# Patient Record
Sex: FEMALE | Race: WHITE
[De-identification: names, ages, dates, MRNs, and addresses within clinical notes are randomized per-mention and may not be internally consistent; named-entity substitution may affect disease eponyms.]

---

## 2017-03-28 ENCOUNTER — HOSPITAL ENCOUNTER (OUTPATIENT)
Dept: HOSPITAL 56 - MW.CHFP | Age: 57
End: 2017-03-28
Attending: FAMILY MEDICINE
Payer: COMMERCIAL

## 2017-03-28 DIAGNOSIS — L71.9: ICD-10-CM

## 2017-03-28 DIAGNOSIS — E66.3: Primary | ICD-10-CM

## 2017-03-28 LAB
CHLORIDE SERPL-SCNC: 103 MMOL/L (ref 98–110)
SODIUM SERPL-SCNC: 140 MMOL/L (ref 136–146)

## 2018-07-05 ENCOUNTER — HOSPITAL ENCOUNTER (EMERGENCY)
Dept: HOSPITAL 56 - MW.ED | Age: 58
Discharge: HOME | End: 2018-07-05
Payer: COMMERCIAL

## 2018-07-05 DIAGNOSIS — Z88.1: ICD-10-CM

## 2018-07-05 DIAGNOSIS — Z91.013: ICD-10-CM

## 2018-07-05 DIAGNOSIS — Z91.048: ICD-10-CM

## 2018-07-05 DIAGNOSIS — R07.89: Primary | ICD-10-CM

## 2018-07-05 DIAGNOSIS — Z88.2: ICD-10-CM

## 2018-07-05 LAB
CHLORIDE SERPL-SCNC: 104 MMOL/L (ref 98–107)
SODIUM SERPL-SCNC: 139 MMOL/L (ref 136–145)

## 2018-07-05 PROCEDURE — 81001 URINALYSIS AUTO W/SCOPE: CPT

## 2018-07-05 PROCEDURE — 93005 ELECTROCARDIOGRAM TRACING: CPT

## 2018-07-05 PROCEDURE — 83690 ASSAY OF LIPASE: CPT

## 2018-07-05 PROCEDURE — 84484 ASSAY OF TROPONIN QUANT: CPT

## 2018-07-05 PROCEDURE — 96361 HYDRATE IV INFUSION ADD-ON: CPT

## 2018-07-05 PROCEDURE — 71045 X-RAY EXAM CHEST 1 VIEW: CPT

## 2018-07-05 PROCEDURE — 85025 COMPLETE CBC W/AUTO DIFF WBC: CPT

## 2018-07-05 PROCEDURE — 99285 EMERGENCY DEPT VISIT HI MDM: CPT

## 2018-07-05 PROCEDURE — 80053 COMPREHEN METABOLIC PANEL: CPT

## 2018-07-05 PROCEDURE — 36415 COLL VENOUS BLD VENIPUNCTURE: CPT

## 2018-07-05 PROCEDURE — 96374 THER/PROPH/DIAG INJ IV PUSH: CPT

## 2018-07-05 NOTE — EDM.PDOC
ED HPI GENERAL MEDICAL PROBLEM





- General


Chief Complaint: Chest Pain


Stated Complaint: PAIN UNDER RIGHT BREAST


Time Seen by Provider: 07/05/18 15:05


Source of Information: Reports: Patient





- History of Present Illness


INITIAL COMMENTS - FREE TEXT/NARRATIVE: 





HISTORY AND PHYSICAL:


History of present illness:


[Patient presents with chest wall pain below her right breast on the lower rib 

margins intermittent sharp pain which began after physical therapy a couple of 

days ago some new exercising





She also carries her purse on the right side and has some new cats that she is 

been chasing around at work in addition to scoliosis I believe all of these 

contribute to the reproducible chest wall pain/muscle spasm reinserting





Current she is asymptomatic at rest however with palpation I can reproduce the 

symptoms she rates pain 0 out of 10 at rest 3-4 out of 10 with palpation





History of cholecystectomy





]


Review of systems: 


As per history of present illness and below otherwise all systems reviewed and 

negative.


Past medical history: 


As per history of present illness and as reviewed below otherwise 

noncontributory.


Surgical history: 


As per history of present illness and as reviewed below otherwise 

noncontributory.


Social history: 


No reported history of drug or alcohol abuse.


Family history: 


As per history of present illness and as reviewed below otherwise 

noncontributory.


Physical exam:


HEENT: Atraumatic, normocephalic, pupils reactive, negative for conjunctival 

pallor or scleral icterus, mucous membranes moist, throat clear, neck supple, 

nontender, trachea midline.


Lungs: Clear to auscultation, breath sounds equal bilaterally, chest nontender 

to the left I can reproduce chest wall tenderness on the lower right rib 

margins on the midclavicular line just below the breast


Heart: S1S2, regular, negative for clicks, rubs, or JVD.


Abdomen: Soft, nondistended, nontender. Negative for masses or 

hepatosplenomegaly. Negative for costovertebral tenderness.


Pelvis: Stable nontender.


Genitourinary: Deferred.


Rectal: Deferred.


Extremities: Atraumatic, negative for cords or calf pain. Neurovascular 

unremarkable.


Neuro: Awake, alert, oriented. Cranial nerves II through XII unremarkable. 

Cerebellum unremarkable. Motor and sensory unremarkable throughout. Exam 

nonfocal.


Diagnostics:


[CBC CMP UA troponin


EKG


Chest 1 view


]


Therapeutics:


[ normal saline


Toradol


]





Rest ice ibuprofen





Impression: 


[ reproducible chest wall pain 


Chronic history of baseline


]


Definitive disposition and diagnosis as appropriate pending reevaluation and 

review of above.


  ** Right Breast


Pain Score (Numeric/FACES): 0





- Related Data


 Allergies











Allergy/AdvReac Type Severity Reaction Status Date / Time


 


iodine Allergy  Other Verified 07/05/18 14:57


 


shellfish derived Allergy  Other Verified 07/05/18 14:57


 


sulfamethoxazole Allergy  Other Verified 07/05/18 14:57





[From Bactrim]     


 


trimethoprim [From Bactrim] Allergy  Other Verified 07/05/18 14:57











Home Meds: 


 Home Meds





Albuterol Sulfate [Albuterol Sulfate HFA] 1 - 2 puff INH ASDIRECTED PRN 05/02/

15 [History]


Levothyroxine [Synthroid] 50 mcg PO DAILY 07/05/18 [History]


Omeprazole 20 mg PO DAILY 07/05/18 [History]


Spironolactone 50 mg PO DAILY 07/05/18 [History]











Past Medical History


Respiratory History: Reports: Asthma


OB/GYN History: Reports: Endometriosis, Pregnancy


Other OB/GYN History: breast implant


Musculoskeletal History: Reports: Other (See Below)


Other Musculoskeletal History: spine curvature


Psychiatric History: Reports: None


Dermatologic History: Reports: Melanoma





- Infectious Disease History


Infectious Disease History: Reports: None





- Past Surgical History


GI Surgical History: Reports: Cholecystectomy


Female  Surgical History: Reports: D&C





Social & Family History





- Family History


Family Medical History: Noncontributory





- Tobacco Use


Smoking Status *Q: Never Smoker





- Caffeine Use


Caffeine Use: Reports: Coffee





- Recreational Drug Use


Recreational Drug Use: No





ED ROS GENERAL





- Review of Systems


Review Of Systems: See Below





ED EXAM, GENERAL





- Physical Exam


Exam: See Below





Course





- Vital Signs


Last Recorded V/S: 


 Last Vital Signs











Temp  98.0 F   07/05/18 14:57


 


Pulse  67   07/05/18 14:57


 


Resp  16   07/05/18 14:57


 


BP  161/77 H  07/05/18 14:57


 


Pulse Ox  100   07/05/18 14:57














- Orders/Labs/Meds


Orders: 


 Active Orders 24 hr











 Category Date Time Status


 


 EKG Documentation Completion [RC] STAT Care  07/05/18 15:27 Active


 


 Chest 1V Frontal [CR] Stat Exams  07/05/18 15:04 Taken


 


 UA W/MICROSCOPIC [URIN] Stat Lab  07/05/18 16:09 Ordered











Labs: 


 Laboratory Tests











  07/05/18 07/05/18 07/05/18 Range/Units





  15:10 15:10 16:09 


 


WBC  7.03    (4.0-11.0)  K/uL


 


RBC  4.75    (4.30-5.90)  M/uL


 


Hgb  14.5    (12.0-16.0)  g/dL


 


Hct  41.8    (36.0-46.0)  %


 


MCV  88.0    (80.0-98.0)  fL


 


MCH  30.5    (27.0-32.0)  pg


 


MCHC  34.7    (31.0-37.0)  g/dL


 


RDW Std Deviation  41.8    (28.0-62.0)  fl


 


RDW Coeff of Orlin  13    (11.0-15.0)  %


 


Plt Count  281    (150-400)  K/uL


 


MPV  10.10    (7.40-12.00)  fL


 


Neut % (Auto)  57.5    (48.0-80.0)  %


 


Lymph % (Auto)  31.6    (16.0-40.0)  %


 


Mono % (Auto)  8.7    (0.0-15.0)  %


 


Eos % (Auto)  1.6    (0.0-7.0)  %


 


Baso % (Auto)  0.6    (0.0-1.5)  %


 


Neut # (Auto)  4.1    (1.4-5.7)  K/uL


 


Lymph # (Auto)  2.2    (0.6-2.4)  K/uL


 


Mono # (Auto)  0.6    (0.0-0.8)  K/uL


 


Eos # (Auto)  0.1    (0.0-0.7)  K/uL


 


Baso # (Auto)  0.0    (0.0-0.1)  K/uL


 


Nucleated RBC %  0.0    /100WBC


 


Nucleated RBCs #  0    K/uL


 


Sodium   139   (136-145)  mmol/L


 


Potassium   3.6   (3.5-5.1)  mmol/L


 


Chloride   104   ()  mmol/L


 


Carbon Dioxide   29.0   (21.0-32.0)  mmol/L


 


BUN   11   (7.0-18.0)  mg/dL


 


Creatinine   0.8   (0.6-1.0)  mg/dL


 


Est Cr Clr Drug Dosing   69.81   mL/min


 


Estimated GFR (MDRD)   > 60.0   ml/min


 


Glucose   93   ()  mg/dL


 


Calcium   9.2   (8.5-10.1)  mg/dL


 


Total Bilirubin   0.7   (0.2-1.0)  mg/dL


 


AST   22   (15-37)  IU/L


 


ALT   25   (14-63)  IU/L


 


Alkaline Phosphatase   99   ()  U/L


 


Troponin I   < 0.050   (0.000-0.056)  ng/mL


 


Total Protein   7.7   (6.4-8.2)  g/dL


 


Albumin   4.2   (3.4-5.0)  g/dL


 


Globulin   3.5   (2.0-3.5)  g/dL


 


Albumin/Globulin Ratio   1.2 L   (1.3-2.8)  


 


Lipase   295   ()  U/L


 


Urine Color    YELLOW  


 


Urine Appearance    CLEAR  


 


Urine pH    5.5  (5.0-8.0)  


 


Ur Specific Gravity    <= 1.005  (1.001-1.035)  


 


Urine Protein    NEGATIVE  (NEGATIVE)  mg/dL


 


Urine Glucose (UA)    NEGATIVE  (NEGATIVE)  mg/dL


 


Urine Ketones    NEGATIVE  (NEGATIVE)  mg/dL


 


Urine Occult Blood    NEGATIVE  (NEGATIVE)  


 


Urine Nitrite    NEGATIVE  (NEGATIVE)  


 


Urine Bilirubin    NEGATIVE  (NEGATIVE)  


 


Urine Urobilinogen    0.2  (<2.0)  EU/dL


 


Ur Leukocyte Esterase    NEGATIVE  (NEGATIVE)  


 


Urine RBC    0-1  (0-2/HPF)  


 


Urine WBC    0-1  (0-5/HPF)  


 


Ur Epithelial Cells    RARE  (NONE-FEW)  


 


Urine Bacteria    RARE  (NEGATIVE)  











Meds: 


Medications














Discontinued Medications














Generic Name Dose Route Start Last Admin





  Trade Name Kevinq  PRN Reason Stop Dose Admin


 


Aspirin  324 mg  07/05/18 15:03  07/05/18 15:16





  Aspirin  PO  07/05/18 15:04  324 mg





  ONETIME ONE   Administration





     





     





     





     


 


Sodium Chloride  1,000 mls @ 999 mls/hr  07/05/18 15:03  07/05/18 15:16





  Normal Saline  IV  07/05/18 16:03  999 mls/hr





  STAT ONE   Administration





     





     





     





     


 


Ketorolac Tromethamine  30 mg  07/05/18 15:05  07/05/18 15:16





  Toradol  IVPUSH  07/05/18 15:06  30 mg





  ONETIME ONE   Administration





     





     





     





     


 


Nitroglycerin  0.4 mg  07/05/18 15:03  





  Nitrostat  SL   





  Q5M PRN   





  Chest Pain   





     





     





     














Departure





- Departure


Time of Disposition: 16:36


Disposition: Home, Self-Care 01


Condition: Good


Clinical Impression: 


 Chest wall pain








- Discharge Information


Forms:  ED Department Discharge


Additional Instructions: 


Rest


Ice or heat whichever gains most benefit


Icy hot patches may benefit as discussed


Motrin 800 mg every 8 hours 7-10 days


Follow-up with primary care in 2 weeks sooner as needed





[facility] - [facility]


[address] [place] [id number]


Phone: [phone]


Fax: [phone]








The following information is given to patients seen in the emergency department 

who are being discharged to home. This information is to outline your options 

for follow-up care. We provide all patients seen in our emergency department 

with a follow-up referral.





The need for follow-up, as well as the timing and circumstances, are variable 

depending upon the specifics of your emergency department visit.





If you don't have a primary care physician on staff, we will provide you with a 

referral. We always advise you to contact your personal physician following an 

emergency department visit to inform them of the circumstance of the visit and 

for follow-up with them and/or the need for any referrals to a consulting 

specialist.





The emergency department will also refer you to a specialist when appropriate. 

This referral assures that you have the opportunity for follow-up care with a 

specialist. All of these measure are taken in an effort to provide you with 

optimal care, which includes your follow-up.





Under all circumstances we always encourage you to contact your private 

physician who remains a resource for coordinating your care. When calling for 

follow-up care, please make the office aware that this follow-up is from your 

recent emergency room visit. If for any reason you are refused follow-up, 

please contact the [facility] emergency department at [phone] 

and asked to speak to the emergency department charge nurse.














- My Orders


Last 24 Hours: 


My Active Orders





07/05/18 15:04


Chest 1V Frontal [CR] Stat 





07/05/18 15:27


EKG Documentation Completion [RC] STAT 





07/05/18 16:09


UA W/MICROSCOPIC [URIN] Stat 














- Assessment/Plan


Last 24 Hours: 


My Active Orders





07/05/18 15:04


Chest 1V Frontal [CR] Stat 





07/05/18 15:27


EKG Documentation Completion [RC] STAT 





07/05/18 16:09


UA W/MICROSCOPIC [URIN] Stat

## 2018-07-06 NOTE — CR
EXAM DATE: 18



PATIENT'S AGE: 58



Patient: GEMINI JORDAN



Facility: [place], [place]

Patient ID: [id number]

Site Patient ID: [id number].

Site Accession #: [id number]

: 1960

Study: XRay Chest [id number]/5/2018 3:48:01 PM

Ordering Physician: Stephan Candelario



Final Report: 

INDICATION: 

CHEST PAIN



TECHNIQUE:

Chest 1 view 



COMPARISON:

None 



FINDINGS:

Cardiovascular and mediastinum: Heart size and vasculature are normal in 
caliber and appearance. Calcified left hilar lymph nodes. 



Lungs and pleural space: No focal consolidation. No sign of pleural effusion. 
No pneumothorax. 



Bones and soft tissues: Healed remote right clavicular fracture. 



IMPRESSION:

No acute cardiopulmonary disease



Dictated by Erlin Meneses MD @ 2018 4:17:29 PM



Dictated by: Erlin Meneses MD @ 2018 16:17:40

(Electronic Signature)





Report Signed by Proxy.
[email or web address]

## 2021-12-04 NOTE — EDM.PDOC
ED HPI GENERAL MEDICAL PROBLEM





- General


Chief Complaint: General


Stated Complaint: RAPID HEARTBEAT, HIGH BLOOD PRESSURE


Time Seen by Provider: 12/04/21 17:34


Source of Information: Reports: Patient


History Limitations: Reports: No Limitations





- History of Present Illness


INITIAL COMMENTS - FREE TEXT/NARRATIVE: 


HISTORY AND PHYSICAL:





History of present illness:


Patient is a 61-year-old female who presents emergency room today with concern 

of lightheadedness, headache, and palpitations that have been ongoing for the 

past 1 week.  Patient states that she does see an ENT provider as she has been 

having sinus issues and chronic congestion and was told to start a Raegan pot 

rinse.  Patient states that she first did this rinse yesterday and has done it 

today as well.  However, patient states that she was having these symptoms prior

to the Raegan pot and has been in the past 1 week.  Patient states that she has 

episodes of palpitations where she feels like her heart is racing but states 

that her heart is not actually racing and is only 70 to 80 bpm.  Patient states 

that she also has a headache in the front of her head but denies any head injury

or loss of consciousness.  Patient has a history of hypertension and states that

she has been consistent with her medication management.





Patient denies fever, chills, chest pain, shortness of breath, or cough. Denies 

neck stiff ness, change in vision, syncope, or near syncope. Denies nausea, 

vomiting, abdominal pain, diarrhea, constipation, or dysuria. Has not noted any 

blood in urine or stool. Patient has been eating and drinking appropriately.





Review of systems: 


As per history of present illness and below otherwise all systems reviewed and 

negative.





Past medical history: 


As per history of present illness and as reviewed below otherwise 

noncontributory.





Surgical history: 


As per history of present illness and as reviewed below otherwise 

noncontributory.





Social history: 


See social history for further information





Family history: 


As per history of present illness and as reviewed below otherwise 

noncontributory.





Physical exam:


General: Patient is alert, oriented, and in no acute distress. Patient sitting 

comfortably on exam table. Vitals stable and reviewed by me.


HEENT: Atraumatic, normocephalic, pupils equal and reactive bilaterally, 

negative for conjunctival pallor or scleral icterus, mucous membranes moist, 

throat clear, neck supple, nontender, trachea midline. No drooling or trismus 

noted. No meningeal signs. No hot potato voice noted. 


Lungs: Clear to auscultation, breath sounds equal bilaterally, chest nontender.


Heart: S1S2, regular rate and rhythm without overt murmur


Abdomen: Soft, nondistended, nontender. Negative for masses or 

hepatosplenomegaly. Negative for costovertebral tenderness.


Pelvis: Stable nontender.


Genitourinary: Deferred.


Rectal: Deferred.


Skin: Intact, warm, dry. No lesions or rashes noted.


Extremities: Atraumatic, negative for cords or calf pain. Neurovascular 

unremarkable.


Neuro: Awake, alert, oriented. Cranial nerves II through XII unremarkable. 

Cerebellum unremarkable. Motor and sensory unremarkable throughout. Exam 

nonfocal.





Medical Decision Making:


Patient is an otherwise healthy 7-year-old female who presents emergency room 

today with concern of dizziness, headache, and palpitations over the past 1 week

worsening over the past several days.  On arrival to the ED, patient is vitally 

stable and well-appearing on exam.  However, she does appear somewhat anxious.  

Exam is otherwise unremarkable.  Will obtain cardiac evaluation, given that 

patient is having a headache, will obtain head CT, and reassess patient.





See Dr. Horowitz dictation for specific EKG interpretation.  Otherwise, normal 

sinus rhythm without STEMI.


CBC and CMP mild derangements are unremarkable.  Troponin negative.  TSH is 

within normal limits.


Chest X-ray shows no evidence of acute pulmonary disease.


Head CT shows no acute intracranial process.





Upon reevaluation of patient, she has improvement of her symptoms today in the 

emergency room with therapeutics.  On further discussion of patient's symptoms, 

she does state that she had the symptoms a year ago after her father had passed.

 She did state that she is coming up on the 1 year anniversary and has been 

feeling more anxious about her father's passing and is concerned that she may 

have some underlying anxiety related to her symptoms.  I did discuss with 

patient the importance of following up with her primary care provider.  I did 

send patient with an outpatient prescription to get a Holter monitor placed for 

2 weeks and to follow-up with her primary care provider for results / further 

management.





Return precautions thoroughly discussed with patient.  Discussed importance for 

follow-up with her primary care provider.


Voices understanding and is agreeable to plan of care. Denies any further 

questions or concerns at this time.





Diagnostics:


EKG, CBC, CMP, chest x-ray, troponin, TSH, head CT without contrast





Therapeutics:


Saline, Ativan





Prescription:


None





Impression: 


Dizziness, unspecified


Headache, unspecified


Palpitations, unspecified





Plan:


1.  You can alternate ibuprofen and Tylenol as directed for pain and discomfort.


2.  Follow-up with a primary care provider as discussed.  Return to the ED as 

needed and as discussed.


3.  Wear the Holter monitor for 2 weeks.  Results will be sent to your primary 

care provider, Dr. Vega for follow up.





Definitive disposition and diagnosis as appropriate pending reevaluation and 

review of above.








- Related Data


                                    Allergies











Allergy/AdvReac Type Severity Reaction Status Date / Time


 


adhesive Allergy  Cannot Verified 12/04/21 17:44





   Remember  


 


iodine Allergy  Swelling Verified 12/04/21 17:44


 


shellfish derived Allergy  Swelling Verified 12/04/21 17:44


 


sulfamethoxazole Allergy  Rash Verified 12/04/21 17:44





[From Bactrim]     


 


trimethoprim [From Bactrim] Allergy  Rash Verified 12/04/21 17:44











Home Meds: 


                                    Home Meds





Spironolactone 50 mg PO DAILY 07/05/18 [History]


amLODIPine [Norvasc] 5 mg PO DAILY 12/04/21 [History]











Past Medical History


HEENT History: Reports: Other (See Below)


Other HEENT History: wears glasses/contacts, has upper partial


Respiratory History: Reports: Asthma


Gastrointestinal History: Reports: GERD


Genitourinary History: Reports: Renal Calculus, UTI, Recurrent


OB/GYN History: Reports: Endometriosis, Pregnancy


Musculoskeletal History: Reports: Fracture


Other Musculoskeletal History: hx fx collarbone and foot


Neurological History: Reports: Concussion, Other (See Below)


Psychiatric History: Reports: None


Endocrine/Metabolic History: Reports: Hypothyroidism


Oncologic (Cancer) History: Reports: Malignant Melanoma


Dermatologic History: Reports: Melanoma





- Infectious Disease History


Infectious Disease History: Reports: None





- Past Surgical History


Head Surgeries/Procedures: Reports: None


HEENT Surgical History: Reports: Tonsillectomy


GI Surgical History: Reports: Cholecystectomy


Female  Surgical History: Reports: Breast Implant, D&C, Other (See Below)


Other Female  Surgeries/Procedures: laparoscopy x3


Dermatological Surgical History: Reports: Skin Biopsy





Social & Family History





- Family History


Family Medical History: No Pertinent Family History





- Tobacco Use


Tobacco Use Status *Q: Never Tobacco User





- Caffeine Use


Caffeine Use: Reports: Coffee





- Recreational Drug Use


Recreational Drug Use: No





ED ROS GENERAL





- Review of Systems


Review Of Systems: Comprehensive ROS is negative, except as noted in HPI.





ED EXAM, GENERAL





- Physical Exam


Exam: See Below (See dictation)





Course





- Vital Signs


Last Recorded V/S: 


                                Last Vital Signs











Temp  97.4 F   12/04/21 17:47


 


Pulse  80   12/04/21 20:11


 


Resp  17   12/04/21 20:11


 


BP  123/56 L  12/04/21 20:11


 


Pulse Ox  94 L  12/04/21 20:11














- Orders/Labs/Meds


Orders: 


                               Active Orders 24 hr











 Category Date Time Status


 


 Saline Lock Insert [OM.PC] Stat Oth  12/04/21 17:58 Ordered











Labs: 


                                Laboratory Tests











  12/04/21 12/04/21 12/04/21 Range/Units





  18:00 18:10 18:10 


 


WBC  8.60    (4.0-11.0)  K/uL


 


RBC  4.78    (4.30-5.90)  M/uL


 


Hgb  14.7    (12.0-16.0)  g/dL


 


Hct  42.3    (36.0-46.0)  %


 


MCV  88.5    (80.0-98.0)  fL


 


MCH  30.8    (27.0-32.0)  pg


 


MCHC  34.8    (31.0-37.0)  g/dL


 


RDW Std Deviation  40.9    (28.0-62.0)  fl


 


RDW Coeff of Orlin  13    (11.0-15.0)  %


 


Plt Count  350    (150-400)  K/uL


 


MPV  10.60    (7.40-12.00)  fL


 


Neut % (Auto)  65.0    (48.0-80.0)  %


 


Lymph % (Auto)  25.5    (16.0-40.0)  %


 


Mono % (Auto)  8.3    (0.0-15.0)  %


 


Eos % (Auto)  0.6    (0.0-7.0)  %


 


Baso % (Auto)  0.6    (0.0-1.5)  %


 


Neut # (Auto)  5.6    (1.4-5.7)  K/uL


 


Lymph # (Auto)  2.2    (0.6-2.4)  K/uL


 


Mono # (Auto)  0.7    (0.0-0.8)  K/uL


 


Eos # (Auto)  0.1    (0.0-0.7)  K/uL


 


Baso # (Auto)  0.1    (0.0-0.1)  K/uL


 


Nucleated RBC %  0.0    /100WBC


 


Nucleated RBCs #  0    K/uL


 


Sodium   138   (136-145)  mmol/L


 


Potassium   3.5   (3.5-5.1)  mmol/L


 


Chloride   100   ()  mmol/L


 


Carbon Dioxide   30.7   (21.0-32.0)  mmol/L


 


BUN   12   (7.0-18.0)  mg/dL


 


Creatinine   0.7   (0.6-1.0)  mg/dL


 


Est Cr Clr Drug Dosing   80.01   mL/min


 


Estimated GFR (MDRD)   > 60.0   ml/min


 


Glucose   95   ()  mg/dL


 


Calcium   9.4   (8.5-10.1)  mg/dL


 


Total Bilirubin   0.7   (0.2-1.0)  mg/dL


 


AST   26   (15-37)  IU/L


 


ALT   38   (14-63)  IU/L


 


Alkaline Phosphatase   116   ()  U/L


 


Troponin I   < 0.050   (0.000-0.056)  ng/mL


 


Total Protein   8.5 H   (6.4-8.2)  g/dL


 


Albumin   4.2   (3.4-5.0)  g/dL


 


Globulin   4.3 H   (2.6-4.0)  g/dL


 


Albumin/Globulin Ratio   1.0   (0.9-1.6)  


 


TSH, Ultra Sensitive    3.59  (0.36-3.74)  uIU/mL











Meds: 


Medications














Discontinued Medications














Generic Name Dose Route Start Last Admin





  Trade Name Freq  PRN Reason Stop Dose Admin


 


Lorazepam  2 mg  12/04/21 18:18  12/04/21 18:32





  Lorazepam 2 Mg/Ml Sdv  IVPUSH  12/04/21 18:19  2 mg





  ONETIME ONE   Administration


 


Sodium Chloride  10 ml  12/04/21 17:58  12/04/21 18:32





  Sodium Chloride 0.9% 10 Ml Syringe  FLUSH   10 ml





  ASDIRECTED PRN   Administration





  Keep Vein Open  


 


Sodium Chloride  2.5 ml  12/04/21 17:58  12/04/21 18:32





  Sodium Chloride 0.9% 2.5 Ml Syringe  FLUSH   2.5 ml





  ASDIRECTED PRN   Administration





  Keep Vein Open  














Departure





- Departure


Time of Disposition: 19:56


Disposition: Home, Self-Care 01


Clinical Impression: 


 Headache, Dizziness, Palpitations








- Discharge Information


Referrals: 


Ganesh Vega MD [Primary Care Provider] - 


Forms:  ED Department Discharge


Additional Instructions: 


The following information is given to patients seen in the emergency department 

who are being discharged to home. This information is to outline your options 

for follow-up care. We provide all patients seen in our emergency department 

with a follow-up referral.





The need for follow-up, as well as the timing and circumstances, are variable 

depending upon the specifics of your emergency department visit.





If you don't have a primary care physician on staff, we will provide you with a 

referral. We always advise you to contact your personal physician following an 

emergency department visit to inform them of the circumstance of the visit and 

for follow-up with them and/or the need for any referrals to a consulting 

specialist.





The emergency department will also refer you to a specialist when appropriate. 

This referral assures that you have the opportunity for follow-up care with a 

specialist. All of these measure are taken in an effort to provide you with 

optimal care, which includes your follow-up.





Under all circumstances we always encourage you to contact your private 

physician who remains a resource for coordinating your care. When calling for 

follow-up care, please make the office aware that this follow-up is from your 

recent emergency room visit. If for any reason you are refused follow-up, please

contact the [facility] Emergency Department

at [phone] and asked to speak to the emergency department charge nurse.





[facility]


Primary Care


[address] [place] [id number]


Phone: [phone]


Fax: [phone]





[address] [address]


Phone: [phone]


Fax: [phone]





1.  You can alternate ibuprofen and Tylenol as directed for pain and discomfort.


2.  Follow-up with a primary care provider as discussed.  Return to the ED as 

needed and as discussed.


3.  Wear the Holter monitor for 2 weeks.  Results will be sent to your primary 

care provider, Dr. Vega for follow up.





 











Sepsis Event Note (ED)





- Evaluation


Sepsis Screening Result: No Definite Risk





- Focused Exam


Vital Signs: 


                                   Vital Signs











  Temp Pulse Resp BP Pulse Ox


 


 12/04/21 20:11   80  17  123/56 L  94 L


 


 12/04/21 17:47  97.4 F  72  16  139/70  98














- My Orders


Last 24 Hours: 


My Active Orders





12/04/21 17:58


Saline Lock Insert [OM.PC] Stat 














- Assessment/Plan


Last 24 Hours: 


My Active Orders





12/04/21 17:58


Saline Lock Insert [OM.PC] Stat

## 2021-12-04 NOTE — CT
Examination:



CT brain



Indication:



Headache 



Technique:



Contiguous axial images from the foramen magnum to the vertex obtained 

without IV contrast. Coronal and sagittal reformats are generated and 

reviewed.



Comparison:



None available



Findings:



No mass effect or midline shift. Basal cisterns are patent. No 

space-occupying lesion or intracranial hemorrhage. No edema or other 

evidence of acute cortical-based infarction. No extra-axial fluid 

collection. Ventricles and sulci are appropriate for the patient`s age. 



Visualized portions of the orbits are unremarkable. Paranasal sinuses and 

mastoid air cells are normal.



Impression:



No acute intracranial process.



Please note that all CT scans at this facility use dose modulation, 

iterative reconstruction, and/or weight-based dosing when appropriate to 

reduce radiation dose to as low as reasonably achievable.



Dictated by Ramu Rodriguez MD @ 12/4/2021 7:24:05 PM



(Electronically Signed)

## 2021-12-04 NOTE — PCM.EKG
** #1 Interpretation


EKG Date: 12/04/21


Time: 18:21


EKG Interpretation Comments: 





EKG:


As interpreted by ER physician: Lor:


Nonspecific ST-T wave abnormalities


Normal axis


No evidence of ST elevation MI


Normal sinus rhythm heart rate of 65

## 2021-12-04 NOTE — CR
INDICATION:



Palpitations.



COMPARISON:



04/20/2020.



FINDINGS:



A PA view of the chest was obtained. The cardiac silhouette and pulmonary 

vasculature are within normal limits. The lungs are clear bilaterally.



IMPRESSION:



No evidence of acute pulmonary disease.



Dictated by Ramu Garcia MD @ 12/4/2021 7:24:29 PM



(Electronically Signed)